# Patient Record
Sex: FEMALE | Race: WHITE | NOT HISPANIC OR LATINO
[De-identification: names, ages, dates, MRNs, and addresses within clinical notes are randomized per-mention and may not be internally consistent; named-entity substitution may affect disease eponyms.]

---

## 2018-04-24 ENCOUNTER — TRANSCRIPTION ENCOUNTER (OUTPATIENT)
Age: 78
End: 2018-04-24

## 2019-07-22 ENCOUNTER — TRANSCRIPTION ENCOUNTER (OUTPATIENT)
Age: 79
End: 2019-07-22

## 2020-05-28 ENCOUNTER — TRANSCRIPTION ENCOUNTER (OUTPATIENT)
Age: 80
End: 2020-05-28

## 2020-06-05 ENCOUNTER — TRANSCRIPTION ENCOUNTER (OUTPATIENT)
Age: 80
End: 2020-06-05

## 2023-04-13 ENCOUNTER — NON-APPOINTMENT (OUTPATIENT)
Age: 83
End: 2023-04-13

## 2023-04-18 ENCOUNTER — TRANSCRIPTION ENCOUNTER (OUTPATIENT)
Age: 83
End: 2023-04-18

## 2023-04-18 RX ORDER — POVIDONE-IODINE 5 %
1 AEROSOL (ML) TOPICAL ONCE
Refills: 0 | Status: COMPLETED | OUTPATIENT
Start: 2023-04-19 | End: 2023-04-19

## 2023-04-18 RX ORDER — CHLORHEXIDINE GLUCONATE 213 G/1000ML
1 SOLUTION TOPICAL EVERY 12 HOURS
Refills: 0 | Status: COMPLETED | OUTPATIENT
Start: 2023-04-19 | End: 2023-04-19

## 2023-04-18 NOTE — PATIENT PROFILE ADULT - FALL HARM RISK - HARM RISK INTERVENTIONS

## 2023-04-19 ENCOUNTER — TRANSCRIPTION ENCOUNTER (OUTPATIENT)
Age: 83
End: 2023-04-19

## 2023-04-19 ENCOUNTER — INPATIENT (INPATIENT)
Facility: HOSPITAL | Age: 83
LOS: 1 days | Discharge: HOME | DRG: 472 | End: 2023-04-21
Payer: MEDICARE

## 2023-04-19 VITALS
TEMPERATURE: 96 F | RESPIRATION RATE: 16 BRPM | HEART RATE: 75 BPM | WEIGHT: 117.29 LBS | SYSTOLIC BLOOD PRESSURE: 169 MMHG | DIASTOLIC BLOOD PRESSURE: 83 MMHG | OXYGEN SATURATION: 96 % | HEIGHT: 62 IN

## 2023-04-19 DIAGNOSIS — R42 DIZZINESS AND GIDDINESS: ICD-10-CM

## 2023-04-19 DIAGNOSIS — Z96.1 PRESENCE OF INTRAOCULAR LENS: Chronic | ICD-10-CM

## 2023-04-19 DIAGNOSIS — K21.9 GASTRO-ESOPHAGEAL REFLUX DISEASE WITHOUT ESOPHAGITIS: ICD-10-CM

## 2023-04-19 DIAGNOSIS — Z98.890 OTHER SPECIFIED POSTPROCEDURAL STATES: Chronic | ICD-10-CM

## 2023-04-19 DIAGNOSIS — M48.02 SPINAL STENOSIS, CERVICAL REGION: ICD-10-CM

## 2023-04-19 DIAGNOSIS — Z41.9 ENCOUNTER FOR PROCEDURE FOR PURPOSES OTHER THAN REMEDYING HEALTH STATE, UNSPECIFIED: Chronic | ICD-10-CM

## 2023-04-19 DIAGNOSIS — I10 ESSENTIAL (PRIMARY) HYPERTENSION: ICD-10-CM

## 2023-04-19 DIAGNOSIS — Z90.89 ACQUIRED ABSENCE OF OTHER ORGANS: Chronic | ICD-10-CM

## 2023-04-19 LAB
BLD GP AB SCN SERPL QL: NEGATIVE — SIGNIFICANT CHANGE UP
RH IG SCN BLD-IMP: POSITIVE — SIGNIFICANT CHANGE UP

## 2023-04-19 DEVICE — SURGIFOAM PAD 8CM X 12.5CM X 10MM (100): Type: IMPLANTABLE DEVICE | Status: FUNCTIONAL

## 2023-04-19 DEVICE — IMPLANTABLE DEVICE: Type: IMPLANTABLE DEVICE | Status: FUNCTIONAL

## 2023-04-19 DEVICE — SET SCREW TI T15: Type: IMPLANTABLE DEVICE | Status: FUNCTIONAL

## 2023-04-19 DEVICE — SCREW POLY 3.5X12MM: Type: IMPLANTABLE DEVICE | Status: FUNCTIONAL

## 2023-04-19 DEVICE — MATRIX COLLAGEN PURAPLY AM 5X5CM 25SQ CM: Type: IMPLANTABLE DEVICE | Status: FUNCTIONAL

## 2023-04-19 DEVICE — SCREW POLY 3.5X14MM: Type: IMPLANTABLE DEVICE | Status: FUNCTIONAL

## 2023-04-19 RX ORDER — HYDROMORPHONE HYDROCHLORIDE 2 MG/ML
0.5 INJECTION INTRAMUSCULAR; INTRAVENOUS; SUBCUTANEOUS EVERY 4 HOURS
Refills: 0 | Status: DISCONTINUED | OUTPATIENT
Start: 2023-04-19 | End: 2023-04-21

## 2023-04-19 RX ORDER — FUROSEMIDE 40 MG
1 TABLET ORAL
Refills: 0 | DISCHARGE

## 2023-04-19 RX ORDER — LANOLIN ALCOHOL/MO/W.PET/CERES
5 CREAM (GRAM) TOPICAL AT BEDTIME
Refills: 0 | Status: DISCONTINUED | OUTPATIENT
Start: 2023-04-19 | End: 2023-04-20

## 2023-04-19 RX ORDER — SENNA PLUS 8.6 MG/1
2 TABLET ORAL AT BEDTIME
Refills: 0 | Status: DISCONTINUED | OUTPATIENT
Start: 2023-04-19 | End: 2023-04-21

## 2023-04-19 RX ORDER — SODIUM CHLORIDE 9 MG/ML
1000 INJECTION, SOLUTION INTRAVENOUS
Refills: 0 | Status: DISCONTINUED | OUTPATIENT
Start: 2023-04-19 | End: 2023-04-20

## 2023-04-19 RX ORDER — ESOMEPRAZOLE MAGNESIUM 40 MG/1
1 CAPSULE, DELAYED RELEASE ORAL
Refills: 0 | DISCHARGE

## 2023-04-19 RX ORDER — CYCLOBENZAPRINE HYDROCHLORIDE 10 MG/1
10 TABLET, FILM COATED ORAL EVERY 8 HOURS
Refills: 0 | Status: DISCONTINUED | OUTPATIENT
Start: 2023-04-19 | End: 2023-04-20

## 2023-04-19 RX ORDER — HYDROMORPHONE HYDROCHLORIDE 2 MG/ML
0.5 INJECTION INTRAMUSCULAR; INTRAVENOUS; SUBCUTANEOUS
Refills: 0 | Status: DISCONTINUED | OUTPATIENT
Start: 2023-04-19 | End: 2023-04-20

## 2023-04-19 RX ORDER — VANCOMYCIN HCL 1 G
1000 VIAL (EA) INTRAVENOUS ONCE
Refills: 0 | Status: COMPLETED | OUTPATIENT
Start: 2023-04-19 | End: 2023-04-19

## 2023-04-19 RX ORDER — APREPITANT 80 MG/1
40 CAPSULE ORAL ONCE
Refills: 0 | Status: COMPLETED | OUTPATIENT
Start: 2023-04-19 | End: 2023-04-19

## 2023-04-19 RX ORDER — ONDANSETRON 8 MG/1
4 TABLET, FILM COATED ORAL EVERY 6 HOURS
Refills: 0 | Status: DISCONTINUED | OUTPATIENT
Start: 2023-04-19 | End: 2023-04-21

## 2023-04-19 RX ORDER — BENZOCAINE AND MENTHOL 5; 1 G/100ML; G/100ML
1 LIQUID ORAL
Refills: 0 | Status: DISCONTINUED | OUTPATIENT
Start: 2023-04-19 | End: 2023-04-21

## 2023-04-19 RX ORDER — ACETAMINOPHEN 500 MG
1000 TABLET ORAL ONCE
Refills: 0 | Status: COMPLETED | OUTPATIENT
Start: 2023-04-19 | End: 2023-04-19

## 2023-04-19 RX ORDER — POLYETHYLENE GLYCOL 3350 17 G/17G
17 POWDER, FOR SOLUTION ORAL DAILY
Refills: 0 | Status: DISCONTINUED | OUTPATIENT
Start: 2023-04-19 | End: 2023-04-21

## 2023-04-19 RX ORDER — HYDROMORPHONE HYDROCHLORIDE 2 MG/ML
0.2 INJECTION INTRAMUSCULAR; INTRAVENOUS; SUBCUTANEOUS
Refills: 0 | Status: DISCONTINUED | OUTPATIENT
Start: 2023-04-19 | End: 2023-04-19

## 2023-04-19 RX ORDER — ACETAMINOPHEN 500 MG
650 TABLET ORAL EVERY 4 HOURS
Refills: 0 | Status: DISCONTINUED | OUTPATIENT
Start: 2023-04-19 | End: 2023-04-20

## 2023-04-19 RX ADMIN — HYDROMORPHONE HYDROCHLORIDE 0.5 MILLIGRAM(S): 2 INJECTION INTRAMUSCULAR; INTRAVENOUS; SUBCUTANEOUS at 16:38

## 2023-04-19 RX ADMIN — Medication 1000 MILLIGRAM(S): at 14:50

## 2023-04-19 RX ADMIN — HYDROMORPHONE HYDROCHLORIDE 0.2 MILLIGRAM(S): 2 INJECTION INTRAMUSCULAR; INTRAVENOUS; SUBCUTANEOUS at 13:35

## 2023-04-19 RX ADMIN — HYDROMORPHONE HYDROCHLORIDE 0.2 MILLIGRAM(S): 2 INJECTION INTRAMUSCULAR; INTRAVENOUS; SUBCUTANEOUS at 13:50

## 2023-04-19 RX ADMIN — HYDROMORPHONE HYDROCHLORIDE 0.2 MILLIGRAM(S): 2 INJECTION INTRAMUSCULAR; INTRAVENOUS; SUBCUTANEOUS at 13:58

## 2023-04-19 RX ADMIN — CHLORHEXIDINE GLUCONATE 1 APPLICATION(S): 213 SOLUTION TOPICAL at 09:50

## 2023-04-19 RX ADMIN — CYCLOBENZAPRINE HYDROCHLORIDE 10 MILLIGRAM(S): 10 TABLET, FILM COATED ORAL at 19:02

## 2023-04-19 RX ADMIN — Medication 1 APPLICATION(S): at 09:50

## 2023-04-19 RX ADMIN — Medication 250 MILLIGRAM(S): at 22:25

## 2023-04-19 RX ADMIN — HYDROMORPHONE HYDROCHLORIDE 0.5 MILLIGRAM(S): 2 INJECTION INTRAMUSCULAR; INTRAVENOUS; SUBCUTANEOUS at 22:24

## 2023-04-19 RX ADMIN — HYDROMORPHONE HYDROCHLORIDE 0.2 MILLIGRAM(S): 2 INJECTION INTRAMUSCULAR; INTRAVENOUS; SUBCUTANEOUS at 14:20

## 2023-04-19 RX ADMIN — HYDROMORPHONE HYDROCHLORIDE 0.5 MILLIGRAM(S): 2 INJECTION INTRAMUSCULAR; INTRAVENOUS; SUBCUTANEOUS at 17:01

## 2023-04-19 RX ADMIN — APREPITANT 40 MILLIGRAM(S): 80 CAPSULE ORAL at 09:49

## 2023-04-19 RX ADMIN — Medication 1000 MILLIGRAM(S): at 09:49

## 2023-04-19 RX ADMIN — Medication 400 MILLIGRAM(S): at 14:25

## 2023-04-19 RX ADMIN — HYDROMORPHONE HYDROCHLORIDE 0.5 MILLIGRAM(S): 2 INJECTION INTRAMUSCULAR; INTRAVENOUS; SUBCUTANEOUS at 22:39

## 2023-04-19 NOTE — H&P ADULT - NSHPPHYSICALEXAM_GEN_ALL_CORE
Gen: NAD  MSK: decreased ROM 2/2 pain at the cervicals pine   UE:  Motor: triceps/biceps/ strength 5/5 bilateral upper extremities   Sensation: intact to light touch throughout bilateral upper extremities   Pulses: 2+ radial pulses bilaterally, extremities warm and well perfused, capillary refill brisk  LE  Motor: quad/TA/GS/EHL/FHl 5/5 bilateral lower extremities  Sensation: intact to light touch throughout bilateral lower extremities    Remainder of exam per medical clearance note

## 2023-04-19 NOTE — ANESTHESIA FOLLOW-UP NOTE - NSEVALATIONFT_GEN_ALL_CORE
Vital signs:                  Stable  Mental Status:            Awake  Airway Patency:	Satisfactory  Hydration Status:	Satisfactory  Nausea/Vomiting:	None  Pain:                             Controlled with current regime

## 2023-04-19 NOTE — H&P ADULT - PROBLEM SELECTOR PLAN 1
Admit to Orthopedic Service   For elective C5-7 ACDF   Medically cleared and optimized for surgery by Dr. Deng

## 2023-04-19 NOTE — DISCHARGE NOTE PROVIDER - NSDCCPCAREPLAN_GEN_ALL_CORE_FT
PRINCIPAL DISCHARGE DIAGNOSIS  Diagnosis: Cervical spinal stenosis  Assessment and Plan of Treatment:

## 2023-04-19 NOTE — DISCHARGE NOTE PROVIDER - NSDCMRMEDTOKEN_GEN_ALL_CORE_FT
ALPRAZolam 0.5 mg oral tablet: 1 orally  antacids:   esomeprazole 20 mg oral delayed release capsule: 1 orally  estradiol 0.5 mg oral tablet: 1 orally  eye lubricant:   furosemide 20 mg oral tablet: 1 orally  hormone replacement: once a day...progesterone 100 mg  ibuprofen 400 mg oral tablet: 1 orally as needed for  moderate pain  Tylenol Extra Strength 500 mg oral tablet: 2 orally as needed for  mild pain  ZyrTEC 10 mg oral tablet: 1 orally once a day (at bedtime)   acetaminophen 325 mg oral tablet: 3 tab(s) orally every 8 hours  ALPRAZolam 0.5 mg oral tablet: 1 tab(s) orally once a day (at bedtime) As needed anxiety  esomeprazole 20 mg oral delayed release capsule: 1 orally  estradiol 0.5 mg oral tablet: 1 orally  furosemide 20 mg oral tablet: 1 orally  methocarbamol 500 mg oral tablet: 1 tab(s) orally every 8 hours MDD: 3  oxyCODONE 5 mg oral tablet: 1 tab(s) orally every 4 to 6 hours as needed for  severe pain 1-2 tabs every 4-6h as needed for moderate to severe pain MDD: 6  senna leaf extract oral tablet: 2 tab(s) orally once a day (at bedtime)
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
## 2023-04-19 NOTE — DISCHARGE NOTE PROVIDER - NSDCFUADDINST_GEN_ALL_CORE_FT
ACTIVITY:   - No extreme bending, extending, turning, twisting, or straining. No strenuous activity, heavy lifting, driving or returning to work until cleared by your surgeon.     DRESSING/SHOWERING:    (STAPLES/SUTURES/STERI-STRIPS)   -Change dressing daily until post-op day 5. Sponge bathe until post-op day 5 then may take full shower. Once dressing removed keep incision clean and dry. Do not pick at your incision. Do not apply creams, ointments or oils to your incision until cleared by your surgeon. Do not soak your incision in sitting water (ie tubs, pools, lakes, etc.) until cleared by your surgeon.      MEDICATION/ANTICOAGULATION:   - You have been prescribed medications for pain:     - Tylenol (Acetaminophen) for mild to moderate pain. Do not exceed 3,000mg daily.     - For more severe pain, you may continue to take the Tylenol with the addition of narcotic pain medication. Take this medication as prescribed. Do not take more than prescribed. Note that this medication may cause drowsiness or dizziness. Do not operate machinery. This medication may cause constipation.   - If you have been prescribed a muscle relaxer, take this medication as needed for muscle spasm. Follow instructions on bottle.   - Try to have regular bowel movements. Take stool softener or laxative if necessary. You may wish to take Miralax daily until you have regular bowel movements.    - Do not take antiinflammatories (Aleve, Advil, Naproxen, Ibuprofen, etc.) until cleared by your surgeon. Tylenol is not an anti-inflammatory and okay to take (see above).   - If you have a pain management physician, please follow-up with them postoperatively.    - If you experience any negative side effects of your medications, please call your surgeon's office to discuss.     FOLLOW UP:   - Call to schedule an appt with Dr. Baird for follow up.    - Please follow-up with your primary care physician or any other specialist you see postoperatively, if needed.    - Contact your doctor or go to the emergency room if you experience: fever greater than 101.5, chills, chest pain, difficulty breathing, redness or excessive drainage around the incision, other concerns.   What Is The Reason For Today's Visit?: History of Melanoma Year Excised?: 2016

## 2023-04-19 NOTE — H&P ADULT - NSICDXPASTSURGICALHX_GEN_ALL_CORE_FT
PAST SURGICAL HISTORY:  Elective surgery surgery to both feet    Elective surgery left wrist surgery    H/O Achilles tendon repair     History of tonsillectomy     S/P lens implant
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
## 2023-04-19 NOTE — PROGRESS NOTE ADULT - SUBJECTIVE AND OBJECTIVE BOX
Orthopaedic Surgery Post Operative Check    Procedure: C5-7 posterior cervical fusion   Surgeon: Dr. Robin     Pt comfortable without complaints, pain controlled. States has some tingling to hands.   Denies CP, SOB, N/V    Vital Signs Last 24 Hrs  T(C): 35.8 (04-19-23 @ 09:35), Max: 35.8 (04-19-23 @ 09:35)  T(F): --  HR: 76 (04-19-23 @ 13:51) (75 - 86)  BP: 184/91 (04-19-23 @ 13:51) (169/83 - 207/100)  BP(mean): 129 (04-19-23 @ 13:51) (129 - 144)  RR: 12 (04-19-23 @ 13:51) (6 - 31)  SpO2: 100% (04-19-23 @ 13:51) (96% - 100%)  AVSS    General: Pt Alert and oriented, NAD  DSG C/D/I posterior neck gauze/paper tape, HV x1 not holding suction (dr. robin's team aware)  Pulses: radial pulses palpable bilateral upper extremities   Sensation: intact to bilateral upper extremities   Motor: biceps/triceps 5/5 bilateral upper extremities         A/P: 82yFemale POD#0 s/p posterior cervical fusion C5-7  - Stable  - Pain Control  - DVT ppx: SCDS  - Post op abx: vancomycin  - PT, WBS:  wbat   - f/u AM labs     Ortho Pager [phone]

## 2023-04-19 NOTE — DISCHARGE NOTE PROVIDER - NSDCCPTREATMENT_GEN_ALL_CORE_FT
PRINCIPAL PROCEDURE  Procedure: Posterior cervical fusion with instrumentation  Findings and Treatment: C5-7

## 2023-04-19 NOTE — H&P ADULT - NSHPLABSRESULTS_GEN_ALL_CORE
Preop CBC, BMP, PT/INR within normal range  Cr 1,09  UA   Preop EKG NSR and reviewed per medical clearance  CARINA T+S DOS

## 2023-04-19 NOTE — DISCHARGE NOTE PROVIDER - HOSPITAL COURSE
Admitted: 4/19/23 to Orthopedics Service  Surgery: Posterior Cervical Fusion C5-C7  Che-op Antibiotics: Ancef  Pain control  DVT prophylaxis: SCDs  OOB/Physical Therapy: WBAT  Consultants:   Inpatient Events:   Admitted: 4/19/23 to Orthopedics Service  Surgery: Posterior Cervical Fusion C5-C7  Che-op Antibiotics: Ancef  Pain control  DVT prophylaxis: SCDs  OOB/Physical Therapy: WBAT

## 2023-04-19 NOTE — DISCHARGE NOTE PROVIDER - CARE PROVIDER_API CALL
Jacinto Baird)  Orthopaedic Surgery  [address] [place] [address]  Phone: [phone]  Fax: [phone]  Follow Up Time: 2 weeks

## 2023-04-20 LAB
ANION GAP SERPL CALC-SCNC: 12 MMOL/L — SIGNIFICANT CHANGE UP (ref 5–17)
BASOPHILS # BLD AUTO: 0.01 K/UL — SIGNIFICANT CHANGE UP (ref 0–0.2)
BASOPHILS NFR BLD AUTO: 0.1 % — SIGNIFICANT CHANGE UP (ref 0–2)
BUN SERPL-MCNC: 17 MG/DL — SIGNIFICANT CHANGE UP (ref 7–23)
CALCIUM SERPL-MCNC: 8.2 MG/DL — LOW (ref 8.4–10.5)
CHLORIDE SERPL-SCNC: 104 MMOL/L — SIGNIFICANT CHANGE UP (ref 96–108)
CO2 SERPL-SCNC: 19 MMOL/L — LOW (ref 22–31)
CREAT SERPL-MCNC: 0.91 MG/DL — SIGNIFICANT CHANGE UP (ref 0.5–1.3)
EGFR: 63 ML/MIN/1.73M2 — SIGNIFICANT CHANGE UP
EOSINOPHIL # BLD AUTO: 0 K/UL — SIGNIFICANT CHANGE UP (ref 0–0.5)
EOSINOPHIL NFR BLD AUTO: 0 % — SIGNIFICANT CHANGE UP (ref 0–6)
GLUCOSE SERPL-MCNC: 147 MG/DL — HIGH (ref 70–99)
HCT VFR BLD CALC: 38.5 % — SIGNIFICANT CHANGE UP (ref 34.5–45)
HGB BLD-MCNC: 12.9 G/DL — SIGNIFICANT CHANGE UP (ref 11.5–15.5)
IMM GRANULOCYTES NFR BLD AUTO: 0.3 % — SIGNIFICANT CHANGE UP (ref 0–0.9)
LYMPHOCYTES # BLD AUTO: 16 % — SIGNIFICANT CHANGE UP (ref 13–44)
LYMPHOCYTES # BLD AUTO: 2.32 K/UL — SIGNIFICANT CHANGE UP (ref 1–3.3)
MCHC RBC-ENTMCNC: 31.6 PG — SIGNIFICANT CHANGE UP (ref 27–34)
MCHC RBC-ENTMCNC: 33.5 GM/DL — SIGNIFICANT CHANGE UP (ref 32–36)
MCV RBC AUTO: 94.4 FL — SIGNIFICANT CHANGE UP (ref 80–100)
MONOCYTES # BLD AUTO: 1.08 K/UL — HIGH (ref 0–0.9)
MONOCYTES NFR BLD AUTO: 7.5 % — SIGNIFICANT CHANGE UP (ref 2–14)
NEUTROPHILS # BLD AUTO: 11.03 K/UL — HIGH (ref 1.8–7.4)
NEUTROPHILS NFR BLD AUTO: 76.1 % — SIGNIFICANT CHANGE UP (ref 43–77)
NRBC # BLD: 0 /100 WBCS — SIGNIFICANT CHANGE UP (ref 0–0)
PLATELET # BLD AUTO: 328 K/UL — SIGNIFICANT CHANGE UP (ref 150–400)
POTASSIUM SERPL-MCNC: 4.1 MMOL/L — SIGNIFICANT CHANGE UP (ref 3.5–5.3)
POTASSIUM SERPL-SCNC: 4.1 MMOL/L — SIGNIFICANT CHANGE UP (ref 3.5–5.3)
RBC # BLD: 4.08 M/UL — SIGNIFICANT CHANGE UP (ref 3.8–5.2)
RBC # FLD: 13.1 % — SIGNIFICANT CHANGE UP (ref 10.3–14.5)
SODIUM SERPL-SCNC: 135 MMOL/L — SIGNIFICANT CHANGE UP (ref 135–145)
WBC # BLD: 14.49 K/UL — HIGH (ref 3.8–10.5)
WBC # FLD AUTO: 14.49 K/UL — HIGH (ref 3.8–10.5)

## 2023-04-20 RX ORDER — ACETAMINOPHEN 500 MG
975 TABLET ORAL EVERY 8 HOURS
Refills: 0 | Status: DISCONTINUED | OUTPATIENT
Start: 2023-04-20 | End: 2023-04-21

## 2023-04-20 RX ORDER — METHOCARBAMOL 500 MG/1
500 TABLET, FILM COATED ORAL EVERY 8 HOURS
Refills: 0 | Status: DISCONTINUED | OUTPATIENT
Start: 2023-04-20 | End: 2023-04-21

## 2023-04-20 RX ORDER — OXYCODONE HYDROCHLORIDE 5 MG/1
5 TABLET ORAL EVERY 4 HOURS
Refills: 0 | Status: DISCONTINUED | OUTPATIENT
Start: 2023-04-20 | End: 2023-04-21

## 2023-04-20 RX ORDER — CALCIUM CARBONATE 500(1250)
1 TABLET ORAL THREE TIMES A DAY
Refills: 0 | Status: DISCONTINUED | OUTPATIENT
Start: 2023-04-20 | End: 2023-04-21

## 2023-04-20 RX ORDER — ALPRAZOLAM 0.25 MG
0.5 TABLET ORAL AT BEDTIME
Refills: 0 | Status: DISCONTINUED | OUTPATIENT
Start: 2023-04-20 | End: 2023-04-21

## 2023-04-20 RX ORDER — OXYCODONE HYDROCHLORIDE 5 MG/1
10 TABLET ORAL EVERY 4 HOURS
Refills: 0 | Status: DISCONTINUED | OUTPATIENT
Start: 2023-04-20 | End: 2023-04-21

## 2023-04-20 RX ADMIN — Medication 975 MILLIGRAM(S): at 06:12

## 2023-04-20 RX ADMIN — OXYCODONE HYDROCHLORIDE 10 MILLIGRAM(S): 5 TABLET ORAL at 18:43

## 2023-04-20 RX ADMIN — SENNA PLUS 2 TABLET(S): 8.6 TABLET ORAL at 22:16

## 2023-04-20 RX ADMIN — Medication 0.5 MILLIGRAM(S): at 23:45

## 2023-04-20 RX ADMIN — Medication 650 MILLIGRAM(S): at 02:00

## 2023-04-20 RX ADMIN — METHOCARBAMOL 500 MILLIGRAM(S): 500 TABLET, FILM COATED ORAL at 06:12

## 2023-04-20 RX ADMIN — OXYCODONE HYDROCHLORIDE 10 MILLIGRAM(S): 5 TABLET ORAL at 23:32

## 2023-04-20 RX ADMIN — OXYCODONE HYDROCHLORIDE 10 MILLIGRAM(S): 5 TABLET ORAL at 23:59

## 2023-04-20 RX ADMIN — Medication 975 MILLIGRAM(S): at 22:18

## 2023-04-20 RX ADMIN — OXYCODONE HYDROCHLORIDE 5 MILLIGRAM(S): 5 TABLET ORAL at 11:33

## 2023-04-20 RX ADMIN — Medication 975 MILLIGRAM(S): at 13:19

## 2023-04-20 RX ADMIN — OXYCODONE HYDROCHLORIDE 10 MILLIGRAM(S): 5 TABLET ORAL at 17:43

## 2023-04-20 RX ADMIN — HYDROMORPHONE HYDROCHLORIDE 0.5 MILLIGRAM(S): 2 INJECTION INTRAMUSCULAR; INTRAVENOUS; SUBCUTANEOUS at 05:49

## 2023-04-20 RX ADMIN — HYDROMORPHONE HYDROCHLORIDE 0.5 MILLIGRAM(S): 2 INJECTION INTRAMUSCULAR; INTRAVENOUS; SUBCUTANEOUS at 12:24

## 2023-04-20 RX ADMIN — Medication 650 MILLIGRAM(S): at 01:00

## 2023-04-20 RX ADMIN — HYDROMORPHONE HYDROCHLORIDE 0.5 MILLIGRAM(S): 2 INJECTION INTRAMUSCULAR; INTRAVENOUS; SUBCUTANEOUS at 06:04

## 2023-04-20 RX ADMIN — METHOCARBAMOL 500 MILLIGRAM(S): 500 TABLET, FILM COATED ORAL at 13:19

## 2023-04-20 RX ADMIN — OXYCODONE HYDROCHLORIDE 5 MILLIGRAM(S): 5 TABLET ORAL at 10:33

## 2023-04-20 RX ADMIN — METHOCARBAMOL 500 MILLIGRAM(S): 500 TABLET, FILM COATED ORAL at 22:16

## 2023-04-20 RX ADMIN — HYDROMORPHONE HYDROCHLORIDE 0.5 MILLIGRAM(S): 2 INJECTION INTRAMUSCULAR; INTRAVENOUS; SUBCUTANEOUS at 12:44

## 2023-04-20 RX ADMIN — CYCLOBENZAPRINE HYDROCHLORIDE 10 MILLIGRAM(S): 10 TABLET, FILM COATED ORAL at 05:28

## 2023-04-20 RX ADMIN — Medication 975 MILLIGRAM(S): at 07:12

## 2023-04-20 RX ADMIN — Medication 975 MILLIGRAM(S): at 22:46

## 2023-04-20 NOTE — PHYSICAL THERAPY INITIAL EVALUATION ADULT - MODALITIES TREATMENT COMMENTS
SILT face. Smile symmetrical, tongue protrusion midline, opens/closes eyes, raises eyebrows, puffs cheeks intact bilaterally. Shoulder shrug WNL bilaterally
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
## 2023-04-20 NOTE — OCCUPATIONAL THERAPY INITIAL EVALUATION ADULT - PLANNED THERAPY INTERVENTIONS, OT EVAL
ADL retraining/IADL retraining/balance training/neuromuscular re-education/strengthening/transfer training

## 2023-04-20 NOTE — PROGRESS NOTE ADULT - SUBJECTIVE AND OBJECTIVE BOX
Ortho Note    Pt comfortable without complaints, pain controlled  Denies CP, SOB, N/V, new numbness/tingling  Tolerating PO intake, voiding without*** complication.     Vital Signs Last 24 Hrs  T(C): 36.6 (04-20-23 @ 08:40), Max: 36.6 (04-20-23 @ 08:40)  T(F): 97.9 (04-20-23 @ 08:40), Max: 97.9 (04-20-23 @ 08:40)  HR: 74 (04-20-23 @ 08:40) (74 - 74)  BP: 127/61 (04-20-23 @ 08:40) (127/61 - 127/61)  BP(mean): --  RR: 18 (04-20-23 @ 08:40) (18 - 18)  SpO2: 98% (04-20-23 @ 08:40) (98% - 98%)  I&O's Summary    19 Apr 2023 07:01  -  20 Apr 2023 07:00  --------------------------------------------------------  IN: 840 mL / OUT: 658 mL / NET: 182 mL        Pt Alert and oriented, NAD  DSG C/D/I- Gauze and paper tape. HV x1   Pulses: Radial pulses 2+ B/L   Sensation: general sensation to light tough intact B/L Upper exttremity   Motor: B/L deltoid, Bicep, Tricep, ulnar, radial, medial 5/5 strength                          12.9   14.49 )-----------( 328      ( 20 Apr 2023 05:30 )             38.5     04-20    135  |  104  |  17  ----------------------------<  147<H>  4.1   |  19<L>  |  0.91    Ca    8.2<L>      20 Apr 2023 05:30        A/P: 82yFemale s/p C5-C7 Posterior cervical fusion 4/19/23, Dr. Baird   - Stable  -Plan to Replace HV canister   - Pain Control: -Encourage to take PO pain medication   - DVT ppx: SCDs   - PT, WBS: WBAT  -Dispo pending OT clearance   Ortho Pager [phone] Ortho Note    Pt comfortable, reports tolerable pain. She has not taken oral pain medication yet but would like to try today.   Denies CP, SOB, N/V, new numbness/tingling. She does report balance issues that are chronic and limited her interaction with OT yesterday.   Tolerating PO intake, voiding without complication, with use of bed pan.     Vital Signs Last 24 Hrs  T(C): 36.6 (04-20-23 @ 08:40), Max: 36.6 (04-20-23 @ 08:40)  T(F): 97.9 (04-20-23 @ 08:40), Max: 97.9 (04-20-23 @ 08:40)  HR: 74 (04-20-23 @ 08:40) (74 - 74)  BP: 127/61 (04-20-23 @ 08:40) (127/61 - 127/61)  BP(mean): --  RR: 18 (04-20-23 @ 08:40) (18 - 18)  SpO2: 98% (04-20-23 @ 08:40) (98% - 98%)  I&O's Summary    19 Apr 2023 07:01  -  20 Apr 2023 07:00  --------------------------------------------------------  IN: 840 mL / OUT: 658 mL / NET: 182 mL      Pt Alert and oriented, NAD  DSG C/D/I- Gauze and paper tape. HV x1, not holding suction.  Pulses: Radial pulses 2+ B/L UE  Sensation: general sensation to light tough intact B/L Upper extremity   Motor: B/L deltoid, Bicep, Tricep, ulnar, radial, median 5/5 strength                          12.9   14.49 )-----------( 328      ( 20 Apr 2023 05:30 )             38.5     04-20    135  |  104  |  17  ----------------------------<  147<H>  4.1   |  19<L>  |  0.91    Ca    8.2<L>      20 Apr 2023 05:30        A/P: 82yFemale s/p C5-C7 Posterior cervical fusion 4/19/23, Dr. Baird   - Stable  - Plan to replace HV david. Monitor HV.  - Pain Control: -Encourage to take PO pain medication   - DVT ppx: SCDs   - PT, WBS: WBAT  - Dispo pending OT session    Ortho Pager [phone] Ortho Note    Pt comfortable, reports tolerable pain. She has not taken oral pain medication yet but would like to try today.   Denies CP, SOB, N/V, new numbness/tingling. She does report balance issues that are chronic and limited her interaction with OT yesterday.   Tolerating PO intake, voiding without complication, with use of bed pan.     Vital Signs Last 24 Hrs  T(C): 36.6 (04-20-23 @ 08:40), Max: 36.6 (04-20-23 @ 08:40)  T(F): 97.9 (04-20-23 @ 08:40), Max: 97.9 (04-20-23 @ 08:40)  HR: 74 (04-20-23 @ 08:40) (74 - 74)  BP: 127/61 (04-20-23 @ 08:40) (127/61 - 127/61)  BP(mean): --  RR: 18 (04-20-23 @ 08:40) (18 - 18)  SpO2: 98% (04-20-23 @ 08:40) (98% - 98%)  I&O's Summary    19 Apr 2023 07:01  -  20 Apr 2023 07:00  --------------------------------------------------------  IN: 840 mL / OUT: 658 mL / NET: 182 mL      Pt Alert and oriented, NAD  DSG C/D/I- Gauze and paper tape. HV x1, not holding suction.  Pulses: Radial pulses 2+ B/L UE  Sensation: general sensation to light tough intact B/L Upper extremity   Motor: B/L deltoid, Bicep, Tricep, ulnar, radial, median 5/5 strength                          12.9   14.49 )-----------( 328      ( 20 Apr 2023 05:30 )             38.5     04-20    135  |  104  |  17  ----------------------------<  147<H>  4.1   |  19<L>  |  0.91    Ca    8.2<L>      20 Apr 2023 05:30        A/P: 82yFemale s/p C5-C7 Posterior cervical fusion 4/19/23, Dr. Baird   - Stable  - Monitor HV, no need to replace HV, stay on gravity  - Pain Control: -Encourage to take PO pain medication   - DVT ppx: SCDs   - PT, WBS: WBAT  - Dispo: pending OT session, rehab vs home    Ortho Pager [phone]

## 2023-04-20 NOTE — OCCUPATIONAL THERAPY INITIAL EVALUATION ADULT - MODIFIED CLINICAL TEST OF SENSORY INTEGRATION IN BALANCE TEST
Pt. performed functional mob from bed<-> door with Mod Ax1 and hand held assist, noted with decreased step length, decreased foot clearance, small LOB with recovery from therapist
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
## 2023-04-20 NOTE — OCCUPATIONAL THERAPY INITIAL EVALUATION ADULT - ADDITIONAL COMMENTS
Per pt., she lives with  in a private home, [address], ~30 steps inside. She was I in ADLs and functional mob without AD, however reports changes in her balance previously. BR with walk in shower. She is R handed

## 2023-04-20 NOTE — PROGRESS NOTE ADULT - SUBJECTIVE AND OBJECTIVE BOX
Orthopaedic Surgery AM Note     Procedure: C5-7 posterior cervical fusion   Surgeon: Dr. Baird     Pt comfortable without complaints, pain controlled. States has some tingling to hands.   Denies CP, SOB, N/V    Vital Signs Last 24 Hrs  T(C): 36.6 (20 Apr 2023 05:28), Max: 36.8 (19 Apr 2023 13:21)  T(F): 97.8 (20 Apr 2023 05:28), Max: 98.2 (19 Apr 2023 13:21)  HR: 74 (20 Apr 2023 05:28) (74 - 94)  BP: 146/65 (20 Apr 2023 05:28) (123/74 - 207/100)  BP(mean): 90 (19 Apr 2023 20:31) (81 - 144)  RR: 18 (20 Apr 2023 05:28) (6 - 41)  SpO2: 97% (20 Apr 2023 05:28) (93% - 100%)    Parameters below as of 20 Apr 2023 05:28  Patient On (Oxygen Delivery Method): room air      General: Pt Alert and oriented, NAD  DSG C/D/I posterior neck gauze/paper tape, HV x1 not holding suction  Pulses: radial pulses palpable bilateral upper extremities   Sensation: intact to bilateral upper extremities   Motor: biceps/triceps 5/5 bilateral upper extremities         A/P: 82yFemale POD#1 s/p posterior cervical fusion C5-7  - Stable  - Pain Control  - DVT ppx: SCDS  - Post op abx: vancomycin  - PT, WBS:  wbat   - f/u AM labs     Ortho Pager [phone]

## 2023-04-20 NOTE — PHYSICAL THERAPY INITIAL EVALUATION ADULT - ADDITIONAL COMMENTS
Patient lives in a house with her . a few steps to enter and 12 steps to the bed rooms. Patient denies use of AD but owns a cane

## 2023-04-20 NOTE — OCCUPATIONAL THERAPY INITIAL EVALUATION ADULT - GENERAL OBSERVATIONS, REHAB EVAL
RN Asya clearing pt. for OOB. Pt. received semi-supine in bed,+hemovac,+IV (heplocked), +b/l SCDs in NAD agreeable to therapy session.

## 2023-04-21 ENCOUNTER — TRANSCRIPTION ENCOUNTER (OUTPATIENT)
Age: 83
End: 2023-04-21

## 2023-04-21 VITALS
DIASTOLIC BLOOD PRESSURE: 81 MMHG | TEMPERATURE: 98 F | RESPIRATION RATE: 16 BRPM | HEART RATE: 78 BPM | SYSTOLIC BLOOD PRESSURE: 154 MMHG | OXYGEN SATURATION: 92 %

## 2023-04-21 LAB
ANION GAP SERPL CALC-SCNC: 9 MMOL/L — SIGNIFICANT CHANGE UP (ref 5–17)
BASOPHILS # BLD AUTO: 0.04 K/UL — SIGNIFICANT CHANGE UP (ref 0–0.2)
BASOPHILS NFR BLD AUTO: 0.4 % — SIGNIFICANT CHANGE UP (ref 0–2)
BUN SERPL-MCNC: 16 MG/DL — SIGNIFICANT CHANGE UP (ref 7–23)
CALCIUM SERPL-MCNC: 8.3 MG/DL — LOW (ref 8.4–10.5)
CHLORIDE SERPL-SCNC: 104 MMOL/L — SIGNIFICANT CHANGE UP (ref 96–108)
CO2 SERPL-SCNC: 24 MMOL/L — SIGNIFICANT CHANGE UP (ref 22–31)
CREAT SERPL-MCNC: 0.94 MG/DL — SIGNIFICANT CHANGE UP (ref 0.5–1.3)
EGFR: 61 ML/MIN/1.73M2 — SIGNIFICANT CHANGE UP
EOSINOPHIL # BLD AUTO: 0.24 K/UL — SIGNIFICANT CHANGE UP (ref 0–0.5)
EOSINOPHIL NFR BLD AUTO: 2.1 % — SIGNIFICANT CHANGE UP (ref 0–6)
GLUCOSE SERPL-MCNC: 95 MG/DL — SIGNIFICANT CHANGE UP (ref 70–99)
HCT VFR BLD CALC: 40.9 % — SIGNIFICANT CHANGE UP (ref 34.5–45)
HGB BLD-MCNC: 13.3 G/DL — SIGNIFICANT CHANGE UP (ref 11.5–15.5)
IMM GRANULOCYTES NFR BLD AUTO: 0.4 % — SIGNIFICANT CHANGE UP (ref 0–0.9)
LYMPHOCYTES # BLD AUTO: 29.5 % — SIGNIFICANT CHANGE UP (ref 13–44)
LYMPHOCYTES # BLD AUTO: 3.35 K/UL — HIGH (ref 1–3.3)
MCHC RBC-ENTMCNC: 31.5 PG — SIGNIFICANT CHANGE UP (ref 27–34)
MCHC RBC-ENTMCNC: 32.5 GM/DL — SIGNIFICANT CHANGE UP (ref 32–36)
MCV RBC AUTO: 96.9 FL — SIGNIFICANT CHANGE UP (ref 80–100)
MONOCYTES # BLD AUTO: 0.89 K/UL — SIGNIFICANT CHANGE UP (ref 0–0.9)
MONOCYTES NFR BLD AUTO: 7.8 % — SIGNIFICANT CHANGE UP (ref 2–14)
NEUTROPHILS # BLD AUTO: 6.8 K/UL — SIGNIFICANT CHANGE UP (ref 1.8–7.4)
NEUTROPHILS NFR BLD AUTO: 59.8 % — SIGNIFICANT CHANGE UP (ref 43–77)
NRBC # BLD: 0 /100 WBCS — SIGNIFICANT CHANGE UP (ref 0–0)
PLATELET # BLD AUTO: 301 K/UL — SIGNIFICANT CHANGE UP (ref 150–400)
POTASSIUM SERPL-MCNC: 4 MMOL/L — SIGNIFICANT CHANGE UP (ref 3.5–5.3)
POTASSIUM SERPL-SCNC: 4 MMOL/L — SIGNIFICANT CHANGE UP (ref 3.5–5.3)
RBC # BLD: 4.22 M/UL — SIGNIFICANT CHANGE UP (ref 3.8–5.2)
RBC # FLD: 13.5 % — SIGNIFICANT CHANGE UP (ref 10.3–14.5)
SODIUM SERPL-SCNC: 137 MMOL/L — SIGNIFICANT CHANGE UP (ref 135–145)
WBC # BLD: 11.36 K/UL — HIGH (ref 3.8–10.5)
WBC # FLD AUTO: 11.36 K/UL — HIGH (ref 3.8–10.5)

## 2023-04-21 PROCEDURE — 85025 COMPLETE CBC W/AUTO DIFF WBC: CPT

## 2023-04-21 PROCEDURE — 86900 BLOOD TYPING SEROLOGIC ABO: CPT

## 2023-04-21 PROCEDURE — 76000 FLUOROSCOPY <1 HR PHYS/QHP: CPT

## 2023-04-21 PROCEDURE — 97161 PT EVAL LOW COMPLEX 20 MIN: CPT

## 2023-04-21 PROCEDURE — C1889: CPT

## 2023-04-21 PROCEDURE — 97110 THERAPEUTIC EXERCISES: CPT

## 2023-04-21 PROCEDURE — C1713: CPT

## 2023-04-21 PROCEDURE — 97116 GAIT TRAINING THERAPY: CPT

## 2023-04-21 PROCEDURE — 36415 COLL VENOUS BLD VENIPUNCTURE: CPT

## 2023-04-21 PROCEDURE — 86850 RBC ANTIBODY SCREEN: CPT

## 2023-04-21 PROCEDURE — 97535 SELF CARE MNGMENT TRAINING: CPT

## 2023-04-21 PROCEDURE — 80048 BASIC METABOLIC PNL TOTAL CA: CPT

## 2023-04-21 PROCEDURE — 86901 BLOOD TYPING SEROLOGIC RH(D): CPT

## 2023-04-21 PROCEDURE — 97165 OT EVAL LOW COMPLEX 30 MIN: CPT

## 2023-04-21 RX ORDER — ACETAMINOPHEN 500 MG
3 TABLET ORAL
Qty: 0 | Refills: 0 | DISCHARGE
Start: 2023-04-21

## 2023-04-21 RX ORDER — ALPRAZOLAM 0.25 MG
1 TABLET ORAL
Qty: 0 | Refills: 0 | DISCHARGE
Start: 2023-04-21

## 2023-04-21 RX ORDER — METHOCARBAMOL 500 MG/1
1 TABLET, FILM COATED ORAL
Qty: 30 | Refills: 0
Start: 2023-04-21 | End: 2023-04-30

## 2023-04-21 RX ORDER — CETIRIZINE HYDROCHLORIDE 10 MG/1
1 TABLET ORAL
Refills: 0 | DISCHARGE

## 2023-04-21 RX ORDER — OXYCODONE HYDROCHLORIDE 5 MG/1
1 TABLET ORAL
Qty: 42 | Refills: 0
Start: 2023-04-21 | End: 2023-04-27

## 2023-04-21 RX ORDER — SENNA PLUS 8.6 MG/1
2 TABLET ORAL
Qty: 0 | Refills: 0 | DISCHARGE
Start: 2023-04-21

## 2023-04-21 RX ORDER — ALPRAZOLAM 0.25 MG
1 TABLET ORAL
Refills: 0 | DISCHARGE

## 2023-04-21 RX ORDER — IBUPROFEN 200 MG
1 TABLET ORAL
Refills: 0 | DISCHARGE

## 2023-04-21 RX ORDER — ACETAMINOPHEN 500 MG
2 TABLET ORAL
Refills: 0 | DISCHARGE

## 2023-04-21 RX ADMIN — OXYCODONE HYDROCHLORIDE 10 MILLIGRAM(S): 5 TABLET ORAL at 06:49

## 2023-04-21 RX ADMIN — Medication 975 MILLIGRAM(S): at 14:16

## 2023-04-21 RX ADMIN — OXYCODONE HYDROCHLORIDE 10 MILLIGRAM(S): 5 TABLET ORAL at 06:34

## 2023-04-21 RX ADMIN — Medication 975 MILLIGRAM(S): at 06:49

## 2023-04-21 RX ADMIN — Medication 975 MILLIGRAM(S): at 15:16

## 2023-04-21 RX ADMIN — OXYCODONE HYDROCHLORIDE 10 MILLIGRAM(S): 5 TABLET ORAL at 19:00

## 2023-04-21 RX ADMIN — METHOCARBAMOL 500 MILLIGRAM(S): 500 TABLET, FILM COATED ORAL at 06:34

## 2023-04-21 RX ADMIN — OXYCODONE HYDROCHLORIDE 10 MILLIGRAM(S): 5 TABLET ORAL at 20:00

## 2023-04-21 RX ADMIN — OXYCODONE HYDROCHLORIDE 10 MILLIGRAM(S): 5 TABLET ORAL at 13:46

## 2023-04-21 RX ADMIN — METHOCARBAMOL 500 MILLIGRAM(S): 500 TABLET, FILM COATED ORAL at 14:16

## 2023-04-21 RX ADMIN — OXYCODONE HYDROCHLORIDE 10 MILLIGRAM(S): 5 TABLET ORAL at 12:46

## 2023-04-21 RX ADMIN — Medication 975 MILLIGRAM(S): at 06:34

## 2023-04-21 NOTE — DISCHARGE NOTE NURSING/CASE MANAGEMENT/SOCIAL WORK - PATIENT PORTAL LINK FT
You can access the FollowMyHealth Patient Portal offered by [facility] by registering at the following website: [email or web address]. By joining [facility]’s FollowMyHealth portal, you will also be able to view your health information using other applications (apps) compatible with our system. Itraconazole Counseling:  I discussed with the patient the risks of itraconazole including but not limited to liver damage, nausea/vomiting, neuropathy, and severe allergy.  The patient understands that this medication is best absorbed when taken with acidic beverages such as non-diet cola or ginger ale.  The patient understands that monitoring is required including baseline LFTs and repeat LFTs at intervals.  The patient understands that they are to contact us or the primary physician if concerning signs are noted.

## 2023-04-21 NOTE — DISCHARGE NOTE NURSING/CASE MANAGEMENT/SOCIAL WORK - NSDCPEFALRISK_GEN_ALL_CORE
For information on Fall & Injury Prevention, visit: [email or web address] OR  [email or web address] OR  https://www.cdc.gov/steadi/patient.html

## 2023-04-21 NOTE — PROGRESS NOTE ADULT - SUBJECTIVE AND OBJECTIVE BOX
POST OPERATIVE DAY #: 2  STATUS POST: PCF C5-C7                      SUBJECTIVE: Patient seen and examined. Pt. states she feels ok when she is not moving, but has to find a good position to keep from being in so much pain.   Denies any sob/cp/n/v/numbness or tingling in b/l ues.     OBJECTIVE:     Vital Signs Last 24 Hrs  T(C): 36.7 (21 Apr 2023 04:26), Max: 36.8 (20 Apr 2023 21:11)  T(F): 98.1 (21 Apr 2023 04:26), Max: 98.3 (20 Apr 2023 21:11)  HR: 78 (21 Apr 2023 04:26) (78 - 89)  BP: 154/81 (21 Apr 2023 04:26) (131/64 - 154/81)  BP(mean): --  RR: 16 (21 Apr 2023 04:26) (15 - 17)  SpO2: 92% (21 Apr 2023 04:26) (92% - 98%)    Parameters below as of 21 Apr 2023 04:26  Patient On (Oxygen Delivery Method): room air        General: NAD   Affected extremity: B/L UES skin intact, no erythema/ecchymosis/sts  Dressing: clean/dry/intact with 1 hv   Sensation: intact to light touch to patient's baseline  Motor exam: slt intact,  brachial/radial pulses 2+, biceps/triceps/delts, , finger int 5/5               I&O's Detail    20 Apr 2023 07:01  -  21 Apr 2023 07:00  --------------------------------------------------------  IN:  Total IN: 0 mL    OUT:    Blood Loss (mL): 55 mL  Total OUT: 55 mL    Total NET: -55 mL          LABS:                        13.3   11.36 )-----------( 301      ( 21 Apr 2023 05:30 )             40.9     04-21    137  |  104  |  16  ----------------------------<  95  4.0   |  24  |  0.94    Ca    8.3<L>      21 Apr 2023 05:30            MEDICATIONS:    acetaminophen     Tablet .. 975 milliGRAM(s) Oral every 8 hours  ALPRAZolam 0.5 milliGRAM(s) Oral at bedtime PRN  HYDROmorphone  Injectable 0.5 milliGRAM(s) IV Push every 4 hours PRN  methocarbamol 500 milliGRAM(s) Oral every 8 hours  ondansetron Injectable 4 milliGRAM(s) IV Push every 6 hours PRN  oxyCODONE    IR 5 milliGRAM(s) Oral every 4 hours PRN  oxyCODONE    IR 10 milliGRAM(s) Oral every 4 hours PRN          ASSESSMENT AND PLAN: 83yo Female s/p PCF C5-C7    1. Analgesic pain control  2. DVT prophylaxis:   SCDs        3. Weight Bearing Status:  Weight bearing as tolerated B/L UES  4. Disposition: Home today, medically stable.   5. Dc drain using aseptic technique, pt. tolerated well. Dressing changed.  POST OPERATIVE DAY #: 2  STATUS POST: PCF C5-C7                      SUBJECTIVE: Patient seen and examined. Pt. states she feels ok when she is not moving, but has to find a good position to keep from being in so much pain.   Denies any sob/cp/n/v/numbness or tingling in b/l ues.     OBJECTIVE:     Vital Signs Last 24 Hrs  T(C): 36.7 (21 Apr 2023 04:26), Max: 36.8 (20 Apr 2023 21:11)  T(F): 98.1 (21 Apr 2023 04:26), Max: 98.3 (20 Apr 2023 21:11)  HR: 78 (21 Apr 2023 04:26) (78 - 89)  BP: 154/81 (21 Apr 2023 04:26) (131/64 - 154/81)  BP(mean): --  RR: 16 (21 Apr 2023 04:26) (15 - 17)  SpO2: 92% (21 Apr 2023 04:26) (92% - 98%)    Parameters below as of 21 Apr 2023 04:26  Patient On (Oxygen Delivery Method): room air        General: NAD   Affected extremity: B/L UES skin intact, no erythema/ecchymosis/sts  Dressing: clean/dry/intact with 1 hv   Sensation: intact to light touch to patient's baseline  Motor exam: slt intact,  brachial/radial pulses 2+, biceps/triceps/delts, , finger int 5/5               I&O's Detail    20 Apr 2023 07:01  -  21 Apr 2023 07:00  --------------------------------------------------------  IN:  Total IN: 0 mL    OUT:    Blood Loss (mL): 55 mL  Total OUT: 55 mL    Total NET: -55 mL          LABS:                        13.3   11.36 )-----------( 301      ( 21 Apr 2023 05:30 )             40.9     04-21    137  |  104  |  16  ----------------------------<  95  4.0   |  24  |  0.94    Ca    8.3<L>      21 Apr 2023 05:30            MEDICATIONS:    acetaminophen     Tablet .. 975 milliGRAM(s) Oral every 8 hours  ALPRAZolam 0.5 milliGRAM(s) Oral at bedtime PRN  HYDROmorphone  Injectable 0.5 milliGRAM(s) IV Push every 4 hours PRN  methocarbamol 500 milliGRAM(s) Oral every 8 hours  ondansetron Injectable 4 milliGRAM(s) IV Push every 6 hours PRN  oxyCODONE    IR 5 milliGRAM(s) Oral every 4 hours PRN  oxyCODONE    IR 10 milliGRAM(s) Oral every 4 hours PRN          ASSESSMENT AND PLAN: 81yo Female s/p PCF C5-C7    1. Analgesic pain control  2. DVT prophylaxis:   SCDs        3. Weight Bearing Status:  Weight bearing as tolerated B/L UES  4. Disposition: Home today, medically stable.   5. Dc drain using aseptic technique, pt. tolerated well. Dressing changed. Soft collar placed.

## 2023-04-29 DIAGNOSIS — R42 DIZZINESS AND GIDDINESS: ICD-10-CM

## 2023-04-29 DIAGNOSIS — I10 ESSENTIAL (PRIMARY) HYPERTENSION: ICD-10-CM

## 2023-04-29 DIAGNOSIS — M54.12 RADICULOPATHY, CERVICAL REGION: ICD-10-CM

## 2023-04-29 DIAGNOSIS — K21.9 GASTRO-ESOPHAGEAL REFLUX DISEASE WITHOUT ESOPHAGITIS: ICD-10-CM

## 2023-04-29 DIAGNOSIS — F41.9 ANXIETY DISORDER, UNSPECIFIED: ICD-10-CM

## 2023-04-29 DIAGNOSIS — M48.02 SPINAL STENOSIS, CERVICAL REGION: ICD-10-CM

## 2023-04-29 DIAGNOSIS — G99.2 MYELOPATHY IN DISEASES CLASSIFIED ELSEWHERE: ICD-10-CM

## 2023-06-29 NOTE — PHYSICAL THERAPY INITIAL EVALUATION ADULT - PHYSICAL ASSIST/NONPHYSICAL ASSIST: GAIT, REHAB EVAL
verbal cues/1 person assist Erythromycin Counseling:  I discussed with the patient the risks of erythromycin including but not limited to GI upset, allergic reaction, drug rash, diarrhea, increase in liver enzymes, and yeast infections.

## 2023-11-30 PROBLEM — Z86.19 PERSONAL HISTORY OF OTHER INFECTIOUS AND PARASITIC DISEASES: Chronic | Status: ACTIVE | Noted: 2023-04-18

## 2023-11-30 PROBLEM — K21.9 GASTRO-ESOPHAGEAL REFLUX DISEASE WITHOUT ESOPHAGITIS: Chronic | Status: ACTIVE | Noted: 2023-04-18

## 2023-11-30 PROBLEM — I10 ESSENTIAL (PRIMARY) HYPERTENSION: Chronic | Status: ACTIVE | Noted: 2023-04-18
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
## 2023-11-30 PROBLEM — R42 DIZZINESS AND GIDDINESS: Chronic | Status: ACTIVE | Noted: 2023-04-18

## 2023-11-30 PROBLEM — Z87.828 PERSONAL HISTORY OF OTHER (HEALED) PHYSICAL INJURY AND TRAUMA: Chronic | Status: ACTIVE | Noted: 2023-04-18

## 2023-11-30 PROBLEM — S69.92XA UNSPECIFIED INJURY OF LEFT WRIST, HAND AND FINGER(S), INITIAL ENCOUNTER: Chronic | Status: ACTIVE | Noted: 2023-04-18

## 2023-11-30 PROBLEM — M48.02 SPINAL STENOSIS, CERVICAL REGION: Chronic | Status: ACTIVE | Noted: 2023-04-18

## 2023-11-30 PROBLEM — F41.9 ANXIETY DISORDER, UNSPECIFIED: Chronic | Status: ACTIVE | Noted: 2023-04-18

## 2023-12-06 ENCOUNTER — NON-APPOINTMENT (OUTPATIENT)
Age: 83
End: 2023-12-06

## 2023-12-06 ENCOUNTER — APPOINTMENT (OUTPATIENT)
Dept: VASCULAR SURGERY | Facility: CLINIC | Age: 83
End: 2023-12-06
Payer: MEDICARE

## 2023-12-06 VITALS
BODY MASS INDEX: 20.81 KG/M2 | HEART RATE: 87 BPM | HEIGHT: 62.5 IN | WEIGHT: 116 LBS | DIASTOLIC BLOOD PRESSURE: 81 MMHG | SYSTOLIC BLOOD PRESSURE: 150 MMHG

## 2023-12-06 VITALS
WEIGHT: 116 LBS | BODY MASS INDEX: 21.08 KG/M2 | HEART RATE: 87 BPM | SYSTOLIC BLOOD PRESSURE: 150 MMHG | DIASTOLIC BLOOD PRESSURE: 81 MMHG | HEIGHT: 62.01 IN

## 2023-12-06 DIAGNOSIS — M79.662 PAIN IN LEFT LOWER LEG: ICD-10-CM

## 2023-12-06 DIAGNOSIS — E78.5 HYPERLIPIDEMIA, UNSPECIFIED: ICD-10-CM

## 2023-12-06 PROCEDURE — 99214 OFFICE O/P EST MOD 30 MIN: CPT

## 2023-12-06 RX ORDER — ROSUVASTATIN CALCIUM 20 MG/1
20 TABLET, FILM COATED ORAL
Refills: 0 | Status: ACTIVE | COMMUNITY

## 2023-12-06 RX ORDER — PERPHENAZINE 2 MG
TABLET ORAL
Refills: 0 | Status: ACTIVE | COMMUNITY

## 2023-12-06 RX ORDER — PNV NO.95/FERROUS FUM/FOLIC AC 28MG-0.8MG
TABLET ORAL
Refills: 0 | Status: ACTIVE | COMMUNITY

## 2023-12-06 RX ORDER — PROGESTERONE 100 MG/1
100 CAPSULE ORAL
Refills: 0 | Status: ACTIVE | COMMUNITY

## 2023-12-06 RX ORDER — HYDROCHLOROTHIAZIDE 12.5 MG/1
TABLET ORAL
Refills: 0 | Status: ACTIVE | COMMUNITY

## 2023-12-06 RX ORDER — SENNOSIDES 8.6 MG
TABLET ORAL
Refills: 0 | Status: ACTIVE | COMMUNITY

## 2023-12-06 RX ORDER — ESOMEPRAZOLE MAGNESIUM 5 MG/1
GRANULE, DELAYED RELEASE ORAL
Refills: 0 | Status: ACTIVE | COMMUNITY

## 2023-12-06 RX ORDER — ESTRADIOL 0.5 MG/1
0.5 TABLET ORAL
Refills: 0 | Status: ACTIVE | COMMUNITY

## 2023-12-07 ENCOUNTER — RESULT REVIEW (OUTPATIENT)
Age: 83
End: 2023-12-07

## 2023-12-08 ENCOUNTER — APPOINTMENT (OUTPATIENT)
Dept: VASCULAR SURGERY | Facility: HOSPITAL | Age: 83
End: 2023-12-08

## 2023-12-08 ENCOUNTER — RESULT REVIEW (OUTPATIENT)
Age: 83
End: 2023-12-08

## 2023-12-11 ENCOUNTER — TRANSCRIPTION ENCOUNTER (OUTPATIENT)
Age: 83
End: 2023-12-11

## 2023-12-13 ENCOUNTER — TRANSCRIPTION ENCOUNTER (OUTPATIENT)
Age: 83
End: 2023-12-13

## 2023-12-27 ENCOUNTER — TRANSCRIPTION ENCOUNTER (OUTPATIENT)
Age: 83
End: 2023-12-27

## 2024-01-03 ENCOUNTER — APPOINTMENT (OUTPATIENT)
Dept: VASCULAR SURGERY | Facility: CLINIC | Age: 84
End: 2024-01-03
Payer: MEDICARE

## 2024-01-03 VITALS
SYSTOLIC BLOOD PRESSURE: 156 MMHG | HEART RATE: 86 BPM | DIASTOLIC BLOOD PRESSURE: 74 MMHG | BODY MASS INDEX: 20.98 KG/M2 | WEIGHT: 114 LBS | HEIGHT: 62 IN

## 2024-01-03 DIAGNOSIS — E78.00 PURE HYPERCHOLESTEROLEMIA, UNSPECIFIED: ICD-10-CM

## 2024-01-03 PROCEDURE — 99024 POSTOP FOLLOW-UP VISIT: CPT

## 2024-01-03 RX ORDER — ATORVASTATIN CALCIUM 40 MG/1
40 TABLET, FILM COATED ORAL
Qty: 90 | Refills: 3 | Status: ACTIVE | COMMUNITY
Start: 2024-01-03 | End: 1900-01-01

## 2024-01-04 PROBLEM — E78.00 HYPERCHOLESTEROLEMIA: Status: ACTIVE | Noted: 2024-01-03

## 2024-01-04 RX ORDER — ALPRAZOLAM 0.5 MG/1
0.5 TABLET ORAL
Refills: 0 | Status: ACTIVE | COMMUNITY

## 2024-01-04 NOTE — DISCUSSION/SUMMARY
[FreeTextEntry1] : 82 yo female three weeks s/ open thrombectomy of  via a popliteal cutdown for acute lower extremity ischemia. The patient is doing well post-procedure and her incision is healing appropriately. The patient continues to have a palpable left PT pulse. The patient's staples were removed in office. The patient will undergo arterial testing. Continue Eliquis, statin, and aspirin as prescribed.  She will follow up when the results of her arterial testing are available.

## 2024-01-04 NOTE — PHYSICAL EXAM
[Normal Breath Sounds] : Normal breath sounds [2+] : left 2+ [Ankle Swelling (On Exam)] : present [Ankle Swelling On The Left] : of the left ankle [Ankle Swelling On The Right] : mild [Alert] : alert [Oriented to Person] : oriented to person [Oriented to Place] : oriented to place [Oriented to Time] : oriented to time [JVD] : no jugular venous distention  [0] : right 0 [de-identified] : Awake and Alert. [FreeTextEntry1] : biphasic pt signals bilaterally [de-identified] : LLE incision healing appropriately - staples removed, mild edema of left leg, right groin without hematoma [de-identified] : No gross motor or sensory deficits. [de-identified] : Appropriate affect.

## 2024-01-04 NOTE — REASON FOR VISIT
[Spouse] : spouse [de-identified] : Left lower extremity angiogram and a pharmacomechanical thrombectomy of the left SFA, popliteal artery, tibioperoneal trunk, posterior tibial artery, and infusion of t-PA into the posterior tibial artery and initiation of thrombolysis [de-identified] : 12/8/23 [de-identified] : 82 yo female three weeks s/p open thrombectomy of superficial femoral popliteal and posterior tibial arteries and angioplasty of superficial femoral artery for acute lower extremity ischemia. The patient continues to have intermittent left lower extremity pain but is otherwise doing well. The patient reports that she is taking Eliquis as prescribed. She reports pain at night making it difficult to sleep.

## 2024-01-04 NOTE — ADDENDUM
[FreeTextEntry1] : Documented by Lisette Serrato acting as a scribe for CIPRIANO RAMOS on 01/03/2024.

## 2024-01-16 ENCOUNTER — APPOINTMENT (OUTPATIENT)
Dept: VASCULAR SURGERY | Facility: CLINIC | Age: 84
End: 2024-01-16
Payer: MEDICARE

## 2024-01-16 PROCEDURE — 93923 UPR/LXTR ART STDY 3+ LVLS: CPT

## 2024-01-22 ENCOUNTER — APPOINTMENT (OUTPATIENT)
Dept: VASCULAR SURGERY | Facility: CLINIC | Age: 84
End: 2024-01-22

## 2024-01-24 ENCOUNTER — APPOINTMENT (OUTPATIENT)
Dept: VASCULAR SURGERY | Facility: CLINIC | Age: 84
End: 2024-01-24
Payer: MEDICARE

## 2024-01-24 VITALS
DIASTOLIC BLOOD PRESSURE: 78 MMHG | HEIGHT: 62 IN | SYSTOLIC BLOOD PRESSURE: 163 MMHG | HEART RATE: 88 BPM | WEIGHT: 114 LBS | BODY MASS INDEX: 20.98 KG/M2

## 2024-01-24 PROCEDURE — 99024 POSTOP FOLLOW-UP VISIT: CPT

## 2024-01-24 NOTE — DISCUSSION/SUMMARY
[FreeTextEntry1] : 84 yo female approximately 6 weeks s/p open thrombectomy  via a popliteal cutdown for acute lower extremity ischemia. The patient's left lower extremity pain has improved since discontinuing the statin. The patient continues to have a palpable left PT pulse. The patient underwent arterial testing that demonstrated normal ABIs bilaterally. Continue Eliquis and aspirin as prescribed.  Follow up in 6 months for repeat testing.

## 2024-01-24 NOTE — PHYSICAL EXAM
[Normal Breath Sounds] : Normal breath sounds [2+] : left 2+ [0] : right 0 [Ankle Swelling (On Exam)] : present [Ankle Swelling On The Left] : of the left ankle [Ankle Swelling On The Right] : mild [Alert] : alert [Oriented to Person] : oriented to person [Oriented to Place] : oriented to place [Oriented to Time] : oriented to time [JVD] : no jugular venous distention  [de-identified] : Awake and Alert. [FreeTextEntry1] : biphasic pt signals bilaterally [de-identified] : LLE incision well  healed  mild edema of the left leg [de-identified] : Appropriate affect. [de-identified] : No gross motor or sensory deficits.

## 2024-01-24 NOTE — ADDENDUM
[FreeTextEntry1] : Documented by Lisette Serrato acting as a scribe for CIPRIANO RAMOS on 01/24/2024.

## 2024-01-24 NOTE — REASON FOR VISIT
[Spouse] : spouse [de-identified] : Left lower extremity angiogram and a pharmacomechanical thrombectomy of the left SFA, popliteal artery, tibioperoneal trunk, posterior tibial artery, and infusion of t-PA into the posterior tibial artery and initiation of thrombolysis [de-identified] : 12/8/23 [de-identified] : 82 yo female approximately 6 weeks s/p open thrombectomy of superficial femoral popliteal and posterior tibial arteries and angioplasty of superficial femoral artery for acute lower extremity ischemia. The patient continues to have intermittent left lower extremity cramping that makes it difficult for her to walk and sleep. She reports that her symptoms improved after discontinuing the statin early last week. The patient reports that she continues to take Eliquis as prescribed. She underwent arterial testing and presents to discuss the results.

## 2024-04-05 RX ORDER — RIVAROXABAN 20 MG/1
20 TABLET, FILM COATED ORAL
Qty: 90 | Refills: 3 | Status: ACTIVE | COMMUNITY
Start: 2024-04-05 | End: 1900-01-01

## 2024-04-17 ENCOUNTER — APPOINTMENT (OUTPATIENT)
Dept: VASCULAR SURGERY | Facility: CLINIC | Age: 84
End: 2024-04-17
Payer: MEDICARE

## 2024-04-17 VITALS
HEIGHT: 62 IN | DIASTOLIC BLOOD PRESSURE: 72 MMHG | SYSTOLIC BLOOD PRESSURE: 158 MMHG | BODY MASS INDEX: 22.08 KG/M2 | WEIGHT: 120 LBS | HEART RATE: 93 BPM

## 2024-04-17 PROCEDURE — 93931 UPPER EXTREMITY STUDY: CPT | Mod: LT

## 2024-04-17 PROCEDURE — 99214 OFFICE O/P EST MOD 30 MIN: CPT

## 2024-04-17 RX ORDER — OXYCODONE AND ACETAMINOPHEN 5; 325 MG/1; MG/1
5-325 TABLET ORAL
Qty: 30 | Refills: 0 | Status: DISCONTINUED | COMMUNITY
Start: 2024-01-03 | End: 2024-04-17

## 2024-04-17 RX ORDER — APIXABAN 5 MG/1
5 TABLET, FILM COATED ORAL
Qty: 60 | Refills: 3 | Status: DISCONTINUED | COMMUNITY
Start: 2024-01-03 | End: 2024-04-17

## 2024-04-17 RX ORDER — OXYCODONE AND ACETAMINOPHEN 5; 325 MG/1; MG/1
5-325 TABLET ORAL EVERY 6 HOURS
Qty: 28 | Refills: 0 | Status: DISCONTINUED | COMMUNITY
Start: 2023-12-22 | End: 2024-04-17

## 2024-04-17 NOTE — REVIEW OF SYSTEMS
[Leg Claudication] : intermittent leg claudication [Limb Pain] : limb pain [Fever] : no fever [Chills] : no chills [Lower Ext Edema] : no lower extremity edema [Limb Swelling] : no limb swelling

## 2024-04-17 NOTE — HISTORY OF PRESENT ILLNESS
[FreeTextEntry1] :  82 yo female approximately 6 weeks s/p open thrombectomy of superficial femoral popliteal and posterior tibial arteries and angioplasty of superficial femoral artery for acute lower extremity ischemia. The patient  reports worsening pain and discoloration of her left foot. She is no longer able to participate in physical therapy. She continues to take Xarelto as prescribed.

## 2024-04-17 NOTE — ASSESSMENT
[FreeTextEntry1] : 83-year-old female status post thrombectomy of her left lower extremity.  The patient has worsening  left foot pain.  She no longer has a palpable pulse in her popliteal or  posterior tibial arteries.  She underwent an arterial duplex which demonstrated an high grade SFA stenosis. I recommended that she undergo an angiogram and possible angioplasty and stent. The risks and benefits including infection, bleeding and limb loss  were discussed with the patient who agrees to proceed.

## 2024-04-17 NOTE — PHYSICAL EXAM
[2+] : right 2+ [0] : left 0 [Ankle Swelling Bilaterally] : bilaterally  [Alert] : alert [Oriented to Person] : oriented to person [Oriented to Place] : oriented to place [Oriented to Time] : oriented to time [JVD] : no jugular venous distention  [Ankle Swelling (On Exam)] : not present [de-identified] : Awake and Alert [FreeTextEntry1] : biphasic pt right, monophasic PT left [de-identified] : left forefoot with rubor [de-identified] : Appropriate

## 2024-04-19 ENCOUNTER — RESULT REVIEW (OUTPATIENT)
Age: 84
End: 2024-04-19

## 2024-04-23 ENCOUNTER — RESULT REVIEW (OUTPATIENT)
Age: 84
End: 2024-04-23

## 2024-04-23 ENCOUNTER — APPOINTMENT (OUTPATIENT)
Dept: VASCULAR SURGERY | Facility: HOSPITAL | Age: 84
End: 2024-04-23

## 2024-05-02 ENCOUNTER — TRANSCRIPTION ENCOUNTER (OUTPATIENT)
Age: 84
End: 2024-05-02

## 2024-05-15 ENCOUNTER — APPOINTMENT (OUTPATIENT)
Dept: VASCULAR SURGERY | Facility: CLINIC | Age: 84
End: 2024-05-15
Payer: MEDICARE

## 2024-05-15 VITALS
HEART RATE: 80 BPM | SYSTOLIC BLOOD PRESSURE: 176 MMHG | WEIGHT: 114 LBS | HEIGHT: 62 IN | BODY MASS INDEX: 20.98 KG/M2 | DIASTOLIC BLOOD PRESSURE: 78 MMHG

## 2024-05-15 DIAGNOSIS — R30.0 DYSURIA: ICD-10-CM

## 2024-05-15 PROCEDURE — 99214 OFFICE O/P EST MOD 30 MIN: CPT

## 2024-05-15 NOTE — HISTORY OF PRESENT ILLNESS
[FreeTextEntry1] :  84 yo female approximately 6 weeks s/p open thrombectomy of superficial femoral popliteal and posterior tibial arteries and angioplasty of superficial femoral artery for acute lower extremity ischemia. The patient  reports worsening pain and discoloration of her left foot. She is no longer able to participate in physical therapy. She continues to take Xarelto as prescribed.  [de-identified] : 82 yo female s/p an open thrombectomy of her left lower extremity for acute arterial ischemia. She was seen in the office and found to have recurrent ischemia. She is now s/p a left lower extremity angiogram and SFA stenting. She reports that her left lower extremity pain has improved. She is taking Aspirin and Xarelto. She was taking Plavix but developed vaginal bleeding which has now stopped. She does not have a PCP or a gynecologist.

## 2024-05-15 NOTE — ASSESSMENT
[FreeTextEntry1] : 83-year-old female status post thrombectomy of her left lower extremity.  The patient had worsening  left foot pain.  She is now s/p an angiogram and SFA stenting with resolution of her symptoms. She has a 2+ pop pulse and a biphasic PT signal. She has a warm foot. She developed Vaginal bleeding on Plavix and stopped the medication.  I recommended that she make an appointment with a gynecologist and she was given a lost of PMDs.  She will follow-up with me in 1 month for arterial testing

## 2024-05-15 NOTE — PHYSICAL EXAM
[JVD] : no jugular venous distention  [2+] : left 2+ [0] : left 0 [Ankle Swelling (On Exam)] : not present [Ankle Swelling Bilaterally] : bilaterally  [Alert] : alert [Oriented to Person] : oriented to person [Oriented to Place] : oriented to place [Oriented to Time] : oriented to time [de-identified] : Awake and Alert [de-identified] : left forefoot warm and pink  [FreeTextEntry1] : biphasic pt right,,biphasic PT left [de-identified] : Appropriate

## 2024-05-19 ENCOUNTER — NON-APPOINTMENT (OUTPATIENT)
Age: 84
End: 2024-05-19

## 2024-06-12 ENCOUNTER — APPOINTMENT (OUTPATIENT)
Dept: FAMILY MEDICINE | Facility: CLINIC | Age: 84
End: 2024-06-12

## 2024-06-26 ENCOUNTER — APPOINTMENT (OUTPATIENT)
Dept: VASCULAR SURGERY | Facility: CLINIC | Age: 84
End: 2024-06-26
Payer: MEDICARE

## 2024-06-26 VITALS
DIASTOLIC BLOOD PRESSURE: 80 MMHG | WEIGHT: 116 LBS | HEART RATE: 75 BPM | HEIGHT: 62 IN | BODY MASS INDEX: 21.35 KG/M2 | SYSTOLIC BLOOD PRESSURE: 170 MMHG

## 2024-06-26 DIAGNOSIS — I73.9 PERIPHERAL VASCULAR DISEASE, UNSPECIFIED: ICD-10-CM

## 2024-06-26 DIAGNOSIS — I99.8 OTHER DISORDER OF CIRCULATORY SYSTEM: ICD-10-CM

## 2024-06-26 PROCEDURE — 99213 OFFICE O/P EST LOW 20 MIN: CPT

## 2024-07-08 RX ORDER — RIVAROXABAN 20 MG/1
20 TABLET, FILM COATED ORAL
Qty: 90 | Refills: 3 | Status: ACTIVE | COMMUNITY
Start: 2024-07-08 | End: 1900-01-01

## 2024-07-16 ENCOUNTER — APPOINTMENT (OUTPATIENT)
Dept: VASCULAR SURGERY | Facility: CLINIC | Age: 84
End: 2024-07-16

## 2024-07-16 PROCEDURE — 93926 LOWER EXTREMITY STUDY: CPT

## 2024-07-16 PROCEDURE — 93922 UPR/L XTREMITY ART 2 LEVELS: CPT

## 2024-08-01 NOTE — OCCUPATIONAL THERAPY INITIAL EVALUATION ADULT - NSOTDISCHREC_GEN_A_CORE
Physician Progress Note      PATIENT:               CANDELARIA CERDA  [facility] #:                  [id number]  :                       1945  ADMIT DATE:       2024 12:47 PM  DISCH DATE:        2024 1:07 PM  RESPONDING  PROVIDER #:        Shayla Fall MD          QUERY TEXT:    Good morning.    Patient admitted with generalized weakness. Noted documentation of acute UTI   in  H&P, IM Progress notes dated - and  DC Summary.    urine culture showed [id number] colonies/ml mixed urogenital kirsten.    In order to support the diagnosis of UTI, please include additional clinical   indicators in your documentation.  Or please document if the diagnosis of UTI   has been ruled out after further study.    The medical record reflects the following:      Risk Factors: 79 yr old male, DM II, HTN, anemia    Clinical Indicators:  Urinalysis: 500 glucose, moderate blood, large   leukocyte esterase, turbid appearance, >100 WBCs, 4+ bacteria;  urine   culture: [id number] colonies/ml mixed urogenital kirsten; 24 13:17 WBC: 8.9,   24 01:54  WBC: 7.7, 24 00:20 WBC: 7.6, 24 08:51 WBC: 8.8    Treatment: Urinalysis, Urine culture, labs, IV Ceftriaxone, Keflex at   discharge        Thank you,  Kim Barahona RN, CDI  Options provided:  -- UTI present as evidenced by, Please document evidence.  -- UTI was ruled out  -- Other - I will add my own diagnosis  -- Disagree - Not applicable / Not valid  -- Disagree - Clinically unable to determine / Unknown  -- Refer to Clinical Documentation Reviewer    PROVIDER RESPONSE TEXT:    UTI was ruled out after study.    Query created by: Kim Barahona on 2024 7:06 AM      QUERY TEXT:    Good morning.    Patient admitted with generalized weakness. Noted documentation of acute   metabolic encephalopathy in  H&P. IM Progress notes dated - and    DC Summary.    In order to support the diagnosis of metabolic encephalopathy,  Home OT

## 2024-08-02 ENCOUNTER — APPOINTMENT (OUTPATIENT)
Dept: VASCULAR SURGERY | Facility: CLINIC | Age: 84
End: 2024-08-02
Payer: MEDICARE

## 2024-08-02 VITALS
BODY MASS INDEX: 21.35 KG/M2 | HEART RATE: 83 BPM | DIASTOLIC BLOOD PRESSURE: 74 MMHG | HEIGHT: 62 IN | SYSTOLIC BLOOD PRESSURE: 145 MMHG | WEIGHT: 116 LBS

## 2024-08-02 DIAGNOSIS — I99.8 OTHER DISORDER OF CIRCULATORY SYSTEM: ICD-10-CM

## 2024-08-02 DIAGNOSIS — I73.9 PERIPHERAL VASCULAR DISEASE, UNSPECIFIED: ICD-10-CM

## 2024-08-02 PROCEDURE — 99214 OFFICE O/P EST MOD 30 MIN: CPT

## 2024-08-02 NOTE — HISTORY OF PRESENT ILLNESS
[FreeTextEntry1] :  82 yo female approximately 6 weeks s/p open thrombectomy of superficial femoral popliteal and posterior tibial arteries and angioplasty of superficial femoral artery for acute lower extremity ischemia. The patient  reports worsening pain and discoloration of her left foot. She is no longer able to participate in physical therapy. She continues to take Xarelto as prescribed.  [de-identified] : 83 yo female s/p an open thrombectomy of her left lower extremity for acute arterial ischemia presents in follow up.  The patient developed recurrent ischemia post thrombectomy and is  most recently s/p a left lower extremity angiogram and SFA stenting. She reports that she is ambulating without difficulty at this time. She is taking Aspirin and Xarelto.  She does not have a PCP yet. She underwent arterial testing and presents to discuss the results.

## 2024-08-02 NOTE — ASSESSMENT
[FreeTextEntry1] : 84-year-old female status post thrombectomy of her left lower extremity.  The patient had worsening left foot pain secondary to recurrent ischemia. She is now s/p an angiogram and SFA stenting with resolution of her symptoms.  Her feet are warm and pink bilaterally.  She has no ulcerations or skin breakdown.  She has a 2+ pop pulse and a biphasic PT signal.  She underwent arterial testing which demonstrated an  ADAM of 1.0 in the right and 0.61 on the left.  Her arterial duplex demonstrated a patent stent.  I recommended that she continue to walk is much as possible.  She will continue her current medications.  I again emphasized the importance of finding a primary care doctor.  She will follow-up in 6 months for repeat arterial testing.  She will follow-up sooner if she develops problem.

## 2024-08-02 NOTE — PHYSICAL EXAM
[JVD] : no jugular venous distention  [2+] : left 2+ [0] : left 0 [Ankle Swelling (On Exam)] : not present [Ankle Swelling Bilaterally] : bilaterally  [Alert] : alert [Oriented to Person] : oriented to person [Oriented to Place] : oriented to place [Oriented to Time] : oriented to time [de-identified] : Awake and Alert [FreeTextEntry1] : biphasic pt right, biphasic PT left [de-identified] : bilateral feet warm, no ulceration or ski breakdown [de-identified] : Appropriate

## 2025-01-21 ENCOUNTER — APPOINTMENT (OUTPATIENT)
Dept: VASCULAR SURGERY | Facility: CLINIC | Age: 85
End: 2025-01-21
Payer: MEDICARE

## 2025-01-21 PROCEDURE — 93926 LOWER EXTREMITY STUDY: CPT

## 2025-01-21 PROCEDURE — 93922 UPR/L XTREMITY ART 2 LEVELS: CPT

## 2025-01-31 ENCOUNTER — APPOINTMENT (OUTPATIENT)
Dept: HEART AND VASCULAR | Facility: CLINIC | Age: 85
End: 2025-01-31
Payer: MEDICARE

## 2025-01-31 ENCOUNTER — NON-APPOINTMENT (OUTPATIENT)
Age: 85
End: 2025-01-31

## 2025-01-31 VITALS — SYSTOLIC BLOOD PRESSURE: 146 MMHG | DIASTOLIC BLOOD PRESSURE: 82 MMHG | HEART RATE: 85 BPM

## 2025-01-31 VITALS
HEART RATE: 85 BPM | DIASTOLIC BLOOD PRESSURE: 72 MMHG | WEIGHT: 120 LBS | HEIGHT: 62 IN | SYSTOLIC BLOOD PRESSURE: 132 MMHG | OXYGEN SATURATION: 97 % | BODY MASS INDEX: 22.08 KG/M2

## 2025-01-31 VITALS — HEART RATE: 90 BPM | DIASTOLIC BLOOD PRESSURE: 90 MMHG | SYSTOLIC BLOOD PRESSURE: 140 MMHG

## 2025-01-31 VITALS — HEART RATE: 83 BPM | DIASTOLIC BLOOD PRESSURE: 84 MMHG | SYSTOLIC BLOOD PRESSURE: 142 MMHG

## 2025-01-31 DIAGNOSIS — I73.9 PERIPHERAL VASCULAR DISEASE, UNSPECIFIED: ICD-10-CM

## 2025-01-31 DIAGNOSIS — R06.09 OTHER FORMS OF DYSPNEA: ICD-10-CM

## 2025-01-31 DIAGNOSIS — R42 DIZZINESS AND GIDDINESS: ICD-10-CM

## 2025-01-31 DIAGNOSIS — E78.00 PURE HYPERCHOLESTEROLEMIA, UNSPECIFIED: ICD-10-CM

## 2025-01-31 PROCEDURE — G2211 COMPLEX E/M VISIT ADD ON: CPT

## 2025-01-31 PROCEDURE — 99215 OFFICE O/P EST HI 40 MIN: CPT

## 2025-01-31 PROCEDURE — 93000 ELECTROCARDIOGRAM COMPLETE: CPT

## 2025-01-31 RX ORDER — ATORVASTATIN CALCIUM 10 MG/1
10 TABLET, FILM COATED ORAL
Qty: 90 | Refills: 3 | Status: ACTIVE | COMMUNITY
Start: 2025-01-31 | End: 1900-01-01

## 2025-02-01 LAB
ALBUMIN SERPL ELPH-MCNC: 4.2 G/DL
ALP BLD-CCNC: 124 U/L
ALT SERPL-CCNC: 23 U/L
ANION GAP SERPL CALC-SCNC: 14 MMOL/L
AST SERPL-CCNC: 22 U/L
BILIRUB SERPL-MCNC: 0.4 MG/DL
BUN SERPL-MCNC: 24 MG/DL
CALCIUM SERPL-MCNC: 10 MG/DL
CHLORIDE SERPL-SCNC: 104 MMOL/L
CHOLEST SERPL-MCNC: 288 MG/DL
CO2 SERPL-SCNC: 21 MMOL/L
CREAT SERPL-MCNC: 1.05 MG/DL
EGFR: 52 ML/MIN/1.73M2
ESTIMATED AVERAGE GLUCOSE: 126 MG/DL
GLUCOSE SERPL-MCNC: 79 MG/DL
HBA1C MFR BLD HPLC: 6 %
HCT VFR BLD CALC: 43.2 %
HDLC SERPL-MCNC: 70 MG/DL
HGB BLD-MCNC: 14.4 G/DL
LDLC SERPL CALC-MCNC: 195 MG/DL
MCHC RBC-ENTMCNC: 30.3 PG
MCHC RBC-ENTMCNC: 33.3 G/DL
MCV RBC AUTO: 90.8 FL
NONHDLC SERPL-MCNC: 218 MG/DL
NT-PROBNP SERPL-MCNC: 93 PG/ML
PLATELET # BLD AUTO: 316 K/UL
POTASSIUM SERPL-SCNC: 4.5 MMOL/L
PROT SERPL-MCNC: 7.7 G/DL
RBC # BLD: 4.76 M/UL
RBC # FLD: 13.6 %
SODIUM SERPL-SCNC: 139 MMOL/L
TRIGL SERPL-MCNC: 128 MG/DL
TSH SERPL-ACNC: 1.61 UIU/ML
WBC # FLD AUTO: 8.35 K/UL

## 2025-02-05 ENCOUNTER — APPOINTMENT (OUTPATIENT)
Dept: VASCULAR SURGERY | Facility: CLINIC | Age: 85
End: 2025-02-05
Payer: MEDICARE

## 2025-02-05 VITALS
DIASTOLIC BLOOD PRESSURE: 76 MMHG | HEART RATE: 80 BPM | SYSTOLIC BLOOD PRESSURE: 172 MMHG | WEIGHT: 120 LBS | BODY MASS INDEX: 22.08 KG/M2 | HEIGHT: 62 IN

## 2025-02-05 DIAGNOSIS — I73.9 PERIPHERAL VASCULAR DISEASE, UNSPECIFIED: ICD-10-CM

## 2025-02-05 DIAGNOSIS — I99.8 OTHER DISORDER OF CIRCULATORY SYSTEM: ICD-10-CM

## 2025-02-05 PROCEDURE — 99214 OFFICE O/P EST MOD 30 MIN: CPT

## 2025-02-26 ENCOUNTER — RESULT REVIEW (OUTPATIENT)
Age: 85
End: 2025-02-26

## 2025-03-06 ENCOUNTER — APPOINTMENT (OUTPATIENT)
Dept: VASCULAR SURGERY | Facility: HOSPITAL | Age: 85
End: 2025-03-06

## 2025-03-06 ENCOUNTER — RESULT REVIEW (OUTPATIENT)
Age: 85
End: 2025-03-06

## 2025-03-13 ENCOUNTER — TRANSCRIPTION ENCOUNTER (OUTPATIENT)
Age: 85
End: 2025-03-13

## 2025-03-14 ENCOUNTER — APPOINTMENT (OUTPATIENT)
Dept: HEART AND VASCULAR | Facility: CLINIC | Age: 85
End: 2025-03-14
Payer: MEDICARE

## 2025-03-14 ENCOUNTER — NON-APPOINTMENT (OUTPATIENT)
Age: 85
End: 2025-03-14

## 2025-03-14 VITALS
WEIGHT: 119 LBS | HEART RATE: 81 BPM | SYSTOLIC BLOOD PRESSURE: 130 MMHG | BODY MASS INDEX: 21.77 KG/M2 | DIASTOLIC BLOOD PRESSURE: 70 MMHG | OXYGEN SATURATION: 99 %

## 2025-03-14 DIAGNOSIS — I25.10 ATHEROSCLEROTIC HEART DISEASE OF NATIVE CORONARY ARTERY W/OUT ANGINA PECTORIS: ICD-10-CM

## 2025-03-14 DIAGNOSIS — E78.00 PURE HYPERCHOLESTEROLEMIA, UNSPECIFIED: ICD-10-CM

## 2025-03-14 DIAGNOSIS — E78.5 HYPERLIPIDEMIA, UNSPECIFIED: ICD-10-CM

## 2025-03-14 DIAGNOSIS — I99.8 OTHER DISORDER OF CIRCULATORY SYSTEM: ICD-10-CM

## 2025-03-14 DIAGNOSIS — I73.9 PERIPHERAL VASCULAR DISEASE, UNSPECIFIED: ICD-10-CM

## 2025-03-14 PROCEDURE — 93000 ELECTROCARDIOGRAM COMPLETE: CPT

## 2025-03-14 PROCEDURE — 99214 OFFICE O/P EST MOD 30 MIN: CPT

## 2025-03-14 PROCEDURE — G2211 COMPLEX E/M VISIT ADD ON: CPT

## 2025-04-02 ENCOUNTER — APPOINTMENT (OUTPATIENT)
Dept: VASCULAR SURGERY | Facility: CLINIC | Age: 85
End: 2025-04-02
Payer: MEDICARE

## 2025-04-02 VITALS
SYSTOLIC BLOOD PRESSURE: 169 MMHG | HEART RATE: 92 BPM | WEIGHT: 119 LBS | DIASTOLIC BLOOD PRESSURE: 80 MMHG | BODY MASS INDEX: 21.9 KG/M2 | HEIGHT: 62 IN

## 2025-04-02 DIAGNOSIS — I99.8 OTHER DISORDER OF CIRCULATORY SYSTEM: ICD-10-CM

## 2025-04-02 DIAGNOSIS — I73.9 PERIPHERAL VASCULAR DISEASE, UNSPECIFIED: ICD-10-CM

## 2025-04-02 PROCEDURE — 93922 UPR/L XTREMITY ART 2 LEVELS: CPT

## 2025-04-02 PROCEDURE — 99214 OFFICE O/P EST MOD 30 MIN: CPT

## 2025-04-21 RX ORDER — ROSUVASTATIN CALCIUM 10 MG/1
10 TABLET, FILM COATED ORAL
Qty: 30 | Refills: 5 | Status: ACTIVE | COMMUNITY
Start: 2025-04-21 | End: 1900-01-01

## 2025-05-15 RX ORDER — EZETIMIBE 10 MG/1
10 TABLET ORAL
Qty: 30 | Refills: 3 | Status: ACTIVE | COMMUNITY
Start: 2025-05-15 | End: 1900-01-01

## 2025-06-04 DIAGNOSIS — R53.81 OTHER MALAISE: ICD-10-CM

## 2025-06-04 DIAGNOSIS — R26.2 DIFFICULTY IN WALKING, NOT ELSEWHERE CLASSIFIED: ICD-10-CM

## 2025-06-04 DIAGNOSIS — M62.50 MUSCLE WASTING AND ATROPHY, NOT ELSEWHERE CLASSIFIED, UNSPECIFIED SITE: ICD-10-CM

## 2025-06-04 DIAGNOSIS — M62.569 MUSCLE WASTING AND ATROPHY, NOT ELSEWHERE CLASSIFIED, UNSPECIFIED LOWER LEG: ICD-10-CM

## 2025-06-24 ENCOUNTER — APPOINTMENT (OUTPATIENT)
Dept: ORTHOPEDIC SURGERY | Facility: CLINIC | Age: 85
End: 2025-06-24

## 2025-06-24 ENCOUNTER — APPOINTMENT (OUTPATIENT)
Dept: HEART AND VASCULAR | Facility: CLINIC | Age: 85
End: 2025-06-24
Payer: COMMERCIAL

## 2025-06-24 VITALS
OXYGEN SATURATION: 98 % | SYSTOLIC BLOOD PRESSURE: 150 MMHG | BODY MASS INDEX: 21.4 KG/M2 | DIASTOLIC BLOOD PRESSURE: 78 MMHG | WEIGHT: 117 LBS | HEART RATE: 84 BPM

## 2025-06-24 PROCEDURE — 99204 OFFICE O/P NEW MOD 45 MIN: CPT

## 2025-06-24 PROCEDURE — G2211 COMPLEX E/M VISIT ADD ON: CPT

## 2025-06-24 PROCEDURE — 93000 ELECTROCARDIOGRAM COMPLETE: CPT

## 2025-07-08 ENCOUNTER — APPOINTMENT (OUTPATIENT)
Dept: ORTHOPEDIC SURGERY | Facility: CLINIC | Age: 85
End: 2025-07-08
Payer: MEDICARE

## 2025-07-08 VITALS
OXYGEN SATURATION: 98 % | WEIGHT: 117 LBS | SYSTOLIC BLOOD PRESSURE: 159 MMHG | BODY MASS INDEX: 21.53 KG/M2 | HEART RATE: 93 BPM | HEIGHT: 62 IN | DIASTOLIC BLOOD PRESSURE: 88 MMHG

## 2025-07-08 PROBLEM — F41.9 ANXIETY: Status: ACTIVE | Noted: 2025-07-08

## 2025-07-08 PROBLEM — Z98.1 HISTORY OF FUSION OF CERVICAL SPINE: Status: ACTIVE | Noted: 2025-07-08

## 2025-07-08 PROBLEM — M54.50 LUMBAR PAIN: Status: ACTIVE | Noted: 2025-07-08

## 2025-07-08 PROCEDURE — 99202 OFFICE O/P NEW SF 15 MIN: CPT

## 2025-07-08 PROCEDURE — 99212 OFFICE O/P EST SF 10 MIN: CPT

## 2025-07-08 PROCEDURE — 72050 X-RAY EXAM NECK SPINE 4/5VWS: CPT

## 2025-07-09 PROBLEM — M54.12 CERVICAL RADICULITIS: Status: ACTIVE | Noted: 2025-07-09

## 2025-09-15 ENCOUNTER — RX RENEWAL (OUTPATIENT)
Age: 85
End: 2025-09-15

## (undated) DEVICE — DRAPE 3/4 SHEET 52X76"

## (undated) DEVICE — WARMING BLANKET UPPER ADULT

## (undated) DEVICE — PREP BETADINE SPONGE STICKS

## (undated) DEVICE — SYR LUER LOK 30CC

## (undated) DEVICE — FOLEY TRAY 16FR 5CC LF UMETER CLOSED

## (undated) DEVICE — GLV 8.5 PROTEXIS (WHITE)

## (undated) DEVICE — DRILL BIT SYNTHES ORTHO QCP 3.2X195MM ST

## (undated) DEVICE — GLV 9 PROTEXIS ORTHO (BROWN)

## (undated) DEVICE — DRAPE INSTRUMENT POUCH 6.75" X 11"

## (undated) DEVICE — VENODYNE/SCD SLEEVE CALF MEDIUM

## (undated) DEVICE — IRR SYS BONE CLNNG INTERPULSE

## (undated) DEVICE — MIDAS REX MR8 BALL FLUTED LG BORE 5MM X 14CM

## (undated) DEVICE — DRAPE 1/2 SHEET 40X57"

## (undated) DEVICE — DRAPE LIGHT HANDLE COVER (BLUE)

## (undated) DEVICE — STAPLER SKIN PROXIMATE

## (undated) DEVICE — DRAPE C ARM 41X74"

## (undated) DEVICE — DRAPE BACK TABLE COVER 80X90"

## (undated) DEVICE — DRAPE FOR 2 TIER TABLE W/ 8" BACK 72" LONG

## (undated) DEVICE — GOWN TRIMAX XXL

## (undated) DEVICE — Device

## (undated) DEVICE — PACK SPINE

## (undated) DEVICE — MIDAS REX MR8 MATCH HEAD FLUTED LG BORE 3MM X 14CM

## (undated) DEVICE — GLV 8 PROTEXIS (WHITE)

## (undated) DEVICE — ELCTR AQUAMANTYS BIPOLAR SEALER 6.0

## (undated) DEVICE — PREP DURAPREP 26CC

## (undated) DEVICE — DRAIN JACKSON PRATT 3 SPRING RESERVOIR W 10FR PVC DRAIN